# Patient Record
Sex: FEMALE | Race: WHITE
[De-identification: names, ages, dates, MRNs, and addresses within clinical notes are randomized per-mention and may not be internally consistent; named-entity substitution may affect disease eponyms.]

---

## 2018-02-05 ENCOUNTER — HOSPITAL ENCOUNTER (OUTPATIENT)
Dept: HOSPITAL 92 - BICMAMMO | Age: 78
Discharge: HOME | End: 2018-02-05
Attending: SURGERY
Payer: MEDICARE

## 2018-02-05 DIAGNOSIS — Z85.3: ICD-10-CM

## 2018-02-05 DIAGNOSIS — R92.8: Primary | ICD-10-CM

## 2018-02-05 PROCEDURE — 77066 DX MAMMO INCL CAD BI: CPT

## 2018-02-05 PROCEDURE — G0279 TOMOSYNTHESIS, MAMMO: HCPCS

## 2018-12-03 ENCOUNTER — HOSPITAL ENCOUNTER (OUTPATIENT)
Dept: HOSPITAL 92 - BICCT | Age: 78
Discharge: HOME | End: 2018-12-03
Payer: MEDICARE

## 2018-12-03 DIAGNOSIS — K52.9: Primary | ICD-10-CM

## 2018-12-03 DIAGNOSIS — K57.30: ICD-10-CM

## 2018-12-03 DIAGNOSIS — N83.01: ICD-10-CM

## 2018-12-03 PROCEDURE — 74177 CT ABD & PELVIS W/CONTRAST: CPT

## 2018-12-03 NOTE — CT
CT ABDOMEN AND PELVIS WITH CONTRAST:

 

Date:  12/03/18

 

COMPARISON:  

07/03/17. 

 

HISTORY:  

Colitis. Lower abdominal pain and diarrhea since October. 

 

TECHNIQUE:  

Multiple contiguous axial images were obtained in a CT of the abdomen and pelvis with contrast. PO co
ntrast was administered. Coronal reformats were performed. 

 

FINDINGS:

The liver, gallbladder, kidneys, adrenal glands, spleen, and pancreas are unremarkable. No free air, 
free fluid, or stranding changes are seen in the abdomen or pelvis. 

 

There is a stable hypodensity along the right aspect of the pelvis measuring 2.7 cm in size, which ma
y represent a right ovarian cyst/follicle. The reproductive organs are otherwise unremarkable. 

 

There are a few scattered diverticula in the colon. The large and small bowel are otherwise unremarka
ble. The appendix is normal. 

 

No abdominal or pelvic lymphadenopathy are seen. Atherosclerotic calcifications are seen in the aorta
. 

 

Degenerative changes are seen in the spine. The visualized inferior thorax and abdominal wall soft ti
ssues are unremarkable. 

 

IMPRESSION: 

1.  No evidence of acute intra-abdominal/pelvic abnormality. 

2.  Diverticulosis without evidence of acute diverticulitis. 

3.  Stable right ovarian cyst/follicle. 

 

 

POS: [facility]

## 2019-03-05 ENCOUNTER — HOSPITAL ENCOUNTER (OUTPATIENT)
Dept: HOSPITAL 92 - BICMAMMO | Age: 79
Discharge: HOME | End: 2019-03-05
Attending: INTERNAL MEDICINE
Payer: MEDICARE

## 2019-03-05 DIAGNOSIS — Z08: Primary | ICD-10-CM

## 2019-03-05 DIAGNOSIS — Z85.3: ICD-10-CM

## 2019-03-05 PROCEDURE — 77066 DX MAMMO INCL CAD BI: CPT

## 2019-03-05 PROCEDURE — G0279 TOMOSYNTHESIS, MAMMO: HCPCS

## 2019-03-07 NOTE — MMO
FILMS COMPARED:

The present examination has been compared to prior imaging studies performed at

[facility] on 02/02/2015, 02/02/2016, 02/07/2017

and 08/08/2017, and at [facility] on 11/18/2014 and 11/24/2014.

 

MAMMOGRAM FINDINGS:

There are scattered fibroglandular densities.

 

Finding 1:  There are benign appearing calcifications seen in both breasts.

 

Finding 2:  There are biopsy clips seen in both breasts.  There are no

suspicious masses, calcifications or areas of architectural distortion.

 

IMPRESSION:

ALL ABOVE FINDINGS ARE BENIGN.

 

A ROUTINE FOLLOW-UP MAMMOGRAM IN 1 YEAR IS RECOMMENDED.

 

ACR BI-RADS Category 2 - Benign finding